# Patient Record
Sex: FEMALE | Race: WHITE
[De-identification: names, ages, dates, MRNs, and addresses within clinical notes are randomized per-mention and may not be internally consistent; named-entity substitution may affect disease eponyms.]

---

## 2020-11-11 ENCOUNTER — APPOINTMENT (OUTPATIENT)
Dept: HEART AND VASCULAR | Facility: CLINIC | Age: 35
End: 2020-11-11

## 2020-11-18 ENCOUNTER — APPOINTMENT (OUTPATIENT)
Dept: HEART AND VASCULAR | Facility: CLINIC | Age: 35
End: 2020-11-18
Payer: MEDICAID

## 2020-11-18 VITALS — DIASTOLIC BLOOD PRESSURE: 80 MMHG | SYSTOLIC BLOOD PRESSURE: 121 MMHG | HEART RATE: 70 BPM

## 2020-11-18 PROBLEM — Z00.00 ENCOUNTER FOR PREVENTIVE HEALTH EXAMINATION: Status: ACTIVE | Noted: 2020-11-18

## 2020-11-18 PROCEDURE — 99203 OFFICE O/P NEW LOW 30 MIN: CPT

## 2020-11-18 NOTE — HISTORY OF PRESENT ILLNESS
[FreeTextEntry1] : 34-year-old female with past medical history of morbid obesity here for first evaluation and preop evaluation prior to bariatric surgery.\par Patient denies chest pain, shortness of breath, palpitations, syncope, presyncope.\par She walks to the fourth floor of her apartment in a non elevator building and complains on ALTMAN 2/2 fatigue\par Denies any ardiac pathology, renal pathology, or history of CVA\par \par PMH \par none\par \par ALL\par doxycicline\par \par MEDS \par none\par \par FH \par No significant cardiac history in her family\par \par SH\par No smoking, no alcohol, no drugs\par \par EKG 11/11/200\par Normal sinus rhythm, within normal limts \par

## 2020-11-18 NOTE — PHYSICAL EXAM
[FreeTextEntry1] : morbid obesity [Normal Oral Mucosa] : normal oral mucosa [No Oral Pallor] : no oral pallor [No Oral Cyanosis] : no oral cyanosis [Normal Jugular Venous A Waves Present] : normal jugular venous A waves present [Normal Jugular Venous V Waves Present] : normal jugular venous V waves present [No Jugular Venous Freedman A Waves] : no jugular venous freedman A waves [Heart Rate And Rhythm] : heart rate and rhythm were normal [Heart Sounds] : normal S1 and S2 [Murmurs] : no murmurs present [Respiration, Rhythm And Depth] : normal respiratory rhythm and effort [Exaggerated Use Of Accessory Muscles For Inspiration] : no accessory muscle use [Auscultation Breath Sounds / Voice Sounds] : lungs were clear to auscultation bilaterally [Abdomen Soft] : soft [Abdomen Tenderness] : non-tender [Abdomen Mass (___ Cm)] : no abdominal mass palpated [Nail Clubbing] : no clubbing of the fingernails [Cyanosis, Localized] : no localized cyanosis [Petechial Hemorrhages (___cm)] : no petechial hemorrhages [] : no ischemic changes

## 2020-11-18 NOTE — ASSESSMENT
[FreeTextEntry1] : 34-year-old female with past medical history of morbid obesity here for first evaluation and preop evaluation prior to bariatric surgery.\par \par PRE OP EVALUATION \par -The patient has good exercise tolerance without any anginal symptoms and denies any cardiac history\par -recc  baseline echo t\par -EKG SR, wnl \par -If echocardiogram is within normal limits the patient can proceed with bariatric surgery without the need of any addiitonal cardiology work up \par -Recommended to followup with primary M.D. for labs\par \par HTN\par -well controlled off any medications\par -echo as above \par \par HLD\par -obtain fasting lipid panel from PMD\par \par f/u in 2 weeeks for echo results

## 2020-12-16 ENCOUNTER — APPOINTMENT (OUTPATIENT)
Dept: HEART AND VASCULAR | Facility: CLINIC | Age: 35
End: 2020-12-16
Payer: MEDICAID

## 2020-12-16 VITALS — HEART RATE: 73 BPM | DIASTOLIC BLOOD PRESSURE: 70 MMHG | SYSTOLIC BLOOD PRESSURE: 110 MMHG

## 2020-12-16 PROCEDURE — 99214 OFFICE O/P EST MOD 30 MIN: CPT

## 2020-12-16 PROCEDURE — 99072 ADDL SUPL MATRL&STAF TM PHE: CPT

## 2020-12-16 NOTE — ASSESSMENT
[FreeTextEntry1] : 35-year-old female with past medical history of morbid obesity here for follow up evaluation prior to bariatric surgery.\par \par PRE OP EVALUATION \par -The patient has good exercise tolerance without any anginal symptoms and denies any cardiac, CVA or renal history\par - echocardiogram is wnl\par -EKG SR, wnl \par -in view of the above patient can proceed with bariatric surgery without the need of any additional cardiology work up \par -low risk for an intermediate risk procedure\par -Recommended to followup with primary M.D. for labs\par \par HTN\par -well controlled off any medications\par -echo as above \par \par HLD\par -obtain fasting lipid panel from PMD\par \par please do not hesitate to call if any questions\par f/u with PMD

## 2020-12-16 NOTE — HISTORY OF PRESENT ILLNESS
[FreeTextEntry1] : 35-year-old female with past medical history of morbid obesity here for preop evaluation prior to bariatric surgery.\par Patient denies chest pain, shortness of breath, palpitations, syncope, presyncope.\par She walks to the fourth floor of her apartment in a non elevator building and complains on ALTMAN 2/2 fatigue\par Denies anyc ardiac pathology, renal pathology, or history of CVA\par Denies chest pain, palpitations, syncope, presyncope, LE edema, orthopnea. \par \par PMH \par none\par \par ALL\par doxycicline\par \par MEDS \par none\par \par FH \par No significant cardiac history in her family\par \par SH\par No smoking, no alcohol, no drugs\par \par EKG 11/11/200\par Normal sinus rhythm, within normal limits \par \par \par ECHOCARDIOGRAM 12/11/2020\par LV size, wall thickness and systolic function are normal with EF > 55% \par trace MR